# Patient Record
Sex: MALE | Race: BLACK OR AFRICAN AMERICAN | NOT HISPANIC OR LATINO | URBAN - NONMETROPOLITAN AREA
[De-identification: names, ages, dates, MRNs, and addresses within clinical notes are randomized per-mention and may not be internally consistent; named-entity substitution may affect disease eponyms.]

---

## 2018-05-02 PROBLEM — H26.493: Noted: 2018-05-02

## 2018-05-02 PROBLEM — Z96.1: Noted: 2018-01-25

## 2021-05-25 ENCOUNTER — IMPORTED ENCOUNTER (OUTPATIENT)
Dept: URBAN - NONMETROPOLITAN AREA CLINIC 1 | Facility: CLINIC | Age: 66
End: 2021-05-25

## 2021-05-25 PROBLEM — H10.45: Noted: 2021-05-25

## 2021-05-25 PROBLEM — H26.491: Noted: 2018-05-02

## 2021-05-25 PROBLEM — Z96.1: Noted: 2018-01-25

## 2021-05-25 PROCEDURE — 92002 INTRM OPH EXAM NEW PATIENT: CPT

## 2021-05-25 NOTE — PATIENT DISCUSSION
Allergic ConjunctivitisDiscussed findings of exam in detail with the patient. Discussed the episodic nature of the condition and treatment options with the patient. START Pataday qd-prn OU The use of artificial tears and cool compresses were discussed with patient. The offending allergen should be avoided if possible. Patient advised not to rub eyes.

## 2022-04-09 ASSESSMENT — TONOMETRY
OD_IOP_MMHG: 20
OS_IOP_MMHG: 17

## 2022-04-09 ASSESSMENT — VISUAL ACUITY
OS_CC: 20/25+
OD_CC: 20/25

## 2024-01-26 ENCOUNTER — ESTABLISHED PATIENT (OUTPATIENT)
Dept: RURAL CLINIC 2 | Facility: CLINIC | Age: 69
End: 2024-01-26

## 2024-01-26 DIAGNOSIS — H52.13: ICD-10-CM

## 2024-01-26 DIAGNOSIS — Z96.1: ICD-10-CM

## 2024-01-26 DIAGNOSIS — H52.4: ICD-10-CM

## 2024-01-26 DIAGNOSIS — H40.053: ICD-10-CM

## 2024-01-26 DIAGNOSIS — H26.493: ICD-10-CM

## 2024-01-26 DIAGNOSIS — H04.213: ICD-10-CM

## 2024-01-26 DIAGNOSIS — H52.223: ICD-10-CM

## 2024-01-26 DIAGNOSIS — H16.223: ICD-10-CM

## 2024-01-26 PROCEDURE — 92133 CPTRZD OPH DX IMG PST SGM ON: CPT

## 2024-01-26 PROCEDURE — 92015 DETERMINE REFRACTIVE STATE: CPT

## 2024-01-26 PROCEDURE — 92014 COMPRE OPH EXAM EST PT 1/>: CPT

## 2024-01-26 RX ORDER — CYCLOSPORINE OPHTHALMIC SOLUTION 1 MG/ML: 1 SOLUTION/ DROPS OPHTHALMIC TWICE A DAY

## 2024-01-26 ASSESSMENT — VISUAL ACUITY
OS_SC: 20/20
OD_SC: 20/30

## 2024-01-26 ASSESSMENT — TONOMETRY
OS_IOP_MMHG: 20
OD_IOP_MMHG: 20

## 2024-09-19 ENCOUNTER — EMERGENCY VISIT (OUTPATIENT)
Dept: RURAL CLINIC 2 | Facility: CLINIC | Age: 69
End: 2024-09-19

## 2024-09-19 DIAGNOSIS — S05.11XA: ICD-10-CM

## 2024-09-19 DIAGNOSIS — H20.042: ICD-10-CM

## 2024-09-19 DIAGNOSIS — H40.053: ICD-10-CM

## 2024-09-19 DIAGNOSIS — S05.12XA: ICD-10-CM

## 2024-09-19 DIAGNOSIS — H16.223: ICD-10-CM

## 2024-09-19 PROCEDURE — 99214 OFFICE O/P EST MOD 30 MIN: CPT

## 2024-09-26 ENCOUNTER — FOLLOW UP (OUTPATIENT)
Dept: RURAL CLINIC 2 | Facility: CLINIC | Age: 69
End: 2024-09-26

## 2024-09-26 DIAGNOSIS — H20.042: ICD-10-CM

## 2024-09-26 DIAGNOSIS — S05.11XA: ICD-10-CM

## 2024-09-26 DIAGNOSIS — H40.053: ICD-10-CM

## 2024-09-26 DIAGNOSIS — S05.12XA: ICD-10-CM

## 2024-09-26 PROCEDURE — 99213 OFFICE O/P EST LOW 20 MIN: CPT

## 2025-01-29 ENCOUNTER — COMPREHENSIVE EXAM (OUTPATIENT)
Age: 70
End: 2025-01-29

## 2025-01-29 DIAGNOSIS — H26.493: ICD-10-CM

## 2025-01-29 DIAGNOSIS — H04.213: ICD-10-CM

## 2025-01-29 DIAGNOSIS — H40.053: ICD-10-CM

## 2025-01-29 DIAGNOSIS — H16.223: ICD-10-CM

## 2025-01-29 DIAGNOSIS — H52.13: ICD-10-CM

## 2025-01-29 DIAGNOSIS — Z96.1: ICD-10-CM

## 2025-01-29 DIAGNOSIS — H52.4: ICD-10-CM

## 2025-01-29 PROCEDURE — 99214 OFFICE O/P EST MOD 30 MIN: CPT

## 2025-01-29 PROCEDURE — 92015 DETERMINE REFRACTIVE STATE: CPT

## 2025-01-29 PROCEDURE — 92133 CPTRZD OPH DX IMG PST SGM ON: CPT

## 2025-08-22 ENCOUNTER — FOLLOW UP (OUTPATIENT)
Age: 70
End: 2025-08-22

## 2025-08-22 DIAGNOSIS — H40.053: ICD-10-CM

## 2025-08-22 DIAGNOSIS — H16.223: ICD-10-CM

## 2025-08-22 DIAGNOSIS — Z96.1: ICD-10-CM

## 2025-08-22 DIAGNOSIS — H26.493: ICD-10-CM

## 2025-08-22 DIAGNOSIS — H04.213: ICD-10-CM

## 2025-08-22 PROCEDURE — 92083 EXTENDED VISUAL FIELD XM: CPT

## 2025-08-22 PROCEDURE — 99213 OFFICE O/P EST LOW 20 MIN: CPT
